# Patient Record
Sex: MALE | Race: WHITE | NOT HISPANIC OR LATINO | Employment: FULL TIME | URBAN - METROPOLITAN AREA
[De-identification: names, ages, dates, MRNs, and addresses within clinical notes are randomized per-mention and may not be internally consistent; named-entity substitution may affect disease eponyms.]

---

## 2022-01-03 ENCOUNTER — CLINICAL SUPPORT (OUTPATIENT)
Dept: FAMILY MEDICINE CLINIC | Facility: CLINIC | Age: 62
End: 2022-01-03

## 2022-01-03 DIAGNOSIS — Z11.59 ENCOUNTER FOR SCREENING FOR OTHER VIRAL DISEASES: Primary | ICD-10-CM

## 2022-01-03 LAB — SARS-COV-2 RNA RESP QL NAA+PROBE: POSITIVE

## 2022-01-03 PROCEDURE — U0003 INFECTIOUS AGENT DETECTION BY NUCLEIC ACID (DNA OR RNA); SEVERE ACUTE RESPIRATORY SYNDROME CORONAVIRUS 2 (SARS-COV-2) (CORONAVIRUS DISEASE [COVID-19]), AMPLIFIED PROBE TECHNIQUE, MAKING USE OF HIGH THROUGHPUT TECHNOLOGIES AS DESCRIBED BY CMS-2020-01-R: HCPCS | Performed by: FAMILY MEDICINE

## 2022-01-03 PROCEDURE — U0005 INFEC AGEN DETEC AMPLI PROBE: HCPCS | Performed by: FAMILY MEDICINE

## 2022-01-10 ENCOUNTER — TELEMEDICINE (OUTPATIENT)
Dept: FAMILY MEDICINE CLINIC | Facility: CLINIC | Age: 62
End: 2022-01-10
Payer: COMMERCIAL

## 2022-01-10 DIAGNOSIS — U07.1 COVID-19: Primary | ICD-10-CM

## 2022-01-10 PROCEDURE — 99442 PR PHYS/QHP TELEPHONE EVALUATION 11-20 MIN: CPT | Performed by: FAMILY MEDICINE

## 2022-01-10 NOTE — PROGRESS NOTES
COVID-19 Outpatient Progress Note    Assessment/Plan:    Problem List Items Addressed This Visit     None      Visit Diagnoses     COVID-19    -  Primary         Disposition:     Patient with significant improvement of his COVID-19 infection  At this time not demonstrating any shortness of breath or chest tightness  If anything changes to please contact our office  Patient with a long history of pneumothorax given recent accident  Advised him of the risk  He knows and he will closely monitor himself  I have spent 15 minutes directly with the patient  Encounter provider Vale Eastman MD    Provider located at [address] [place] [id number]    Recent Visits  No visits were found meeting these conditions  Showing recent visits within past 7 days and meeting all other requirements  Today's Visits  Date Type Provider Dept   01/10/22 Telemedicine Vale Eastman  [facility] today's visits and meeting all other requirements  Future Appointments  No visits were found meeting these conditions  Showing future appointments within next 150 days and meeting all other requirements       Patient agrees to participate in a virtual check in via telephone or video visit instead of presenting to the office to address urgent/immediate medical needs  Patient is aware this is a billable service  After connecting through [facility], the patient was identified by name and date of birth  Loretta Fairly was informed that this was a telemedicine visit and that the exam was being conducted confidentially over secure lines  My office door was closed  No one else was in the room  Loretta Fairly acknowledged consent and understanding of privacy and security of the telemedicine visit  I informed the patient that I have reviewed his record in Epic and presented the opportunity for him to ask any questions regarding the visit today   The patient agreed to participate  Verification of patient location:  Patient is located in the following state in which I hold an active license: [place]    Subjective:   Shannen Orantes is a 63 y o  male who has been screened for COVID-19  Symptom change since last report: resolving  Patient is currently asymptomatic  Patient denies fever, chills, fatigue, malaise, congestion, rhinorrhea, sore throat, anosmia, loss of taste, cough, shortness of breath, chest tightness, abdominal pain, nausea, vomiting, diarrhea, myalgias and headaches  - Date of symptom onset: 1/3/2022  - Date of positive COVID-19 PCR: 1/3/2022  Type of test: PCR    COVID-19 vaccination status: Fully vaccinated (primary series)    Maggie Anton has been staying home and has isolated themselves in his home  He is taking care to not share personal items and is cleaning all surfaces that are touched often, like counters, tabletops, and doorknobs using household cleaning sprays or wipes  He is wearing a mask when he leaves his room  Last Monday started with cough, and clearing it ok  Lab Results   Component Value Date    SARSCOV2 Positive (A) 01/03/2022     No past medical history on file  No past surgical history on file  No current outpatient medications on file  No current facility-administered medications for this visit  Not on File    Review of Systems   Constitutional: Negative for chills, fatigue and fever  HENT: Negative for congestion, rhinorrhea and sore throat  Respiratory: Negative for cough, chest tightness and shortness of breath  Gastrointestinal: Negative for abdominal pain, diarrhea, nausea and vomiting  Musculoskeletal: Negative for myalgias  Neurological: Negative for headaches  Objective: There were no vitals filed for this visit  VIRTUAL VISIT DISCLAIMER    Shannen Orantes verbally agrees to participate in [employer]   Pt is aware that Virtual Care Services could be limited without vital signs or the ability to perform a full hands-on physical Lina Mohamud understands he or the provider may request at any time to terminate the video visit and request the patient to seek care or treatment in person

## 2022-01-10 NOTE — LETTER
January 10, 2022     Patient: Chelle Galvez   YOB: 1960   Date of Visit: 1/10/2022       To Whom it May Concern:    Chelle Galvez is under my professional care  He was seen in my office on 1/10/2022  He may return to work on 1/14/22  If you have any questions or concerns, please don't hesitate to call           Sincerely,          Abena Lennon MD        CC: No Recipients

## 2023-02-10 ENCOUNTER — OFFICE VISIT (OUTPATIENT)
Dept: FAMILY MEDICINE CLINIC | Facility: CLINIC | Age: 63
End: 2023-02-10

## 2023-02-10 VITALS
DIASTOLIC BLOOD PRESSURE: 78 MMHG | TEMPERATURE: 97.2 F | HEART RATE: 78 BPM | WEIGHT: 253 LBS | HEIGHT: 72 IN | SYSTOLIC BLOOD PRESSURE: 110 MMHG | RESPIRATION RATE: 14 BRPM | BODY MASS INDEX: 34.27 KG/M2

## 2023-02-10 DIAGNOSIS — M25.461 EFFUSION OF RIGHT KNEE: Primary | ICD-10-CM

## 2023-02-10 RX ORDER — NAPROXEN 500 MG/1
500 TABLET ORAL 2 TIMES DAILY PRN
Qty: 30 TABLET | Refills: 0 | Status: SHIPPED | OUTPATIENT
Start: 2023-02-10

## 2023-02-10 RX ORDER — KETOROLAC TROMETHAMINE 30 MG/ML
30 INJECTION, SOLUTION INTRAMUSCULAR; INTRAVENOUS ONCE
Status: COMPLETED | OUTPATIENT
Start: 2023-02-10 | End: 2023-02-10

## 2023-02-10 RX ADMIN — KETOROLAC TROMETHAMINE 30 MG: 30 INJECTION, SOLUTION INTRAMUSCULAR; INTRAVENOUS at 10:00

## 2023-02-10 NOTE — PROGRESS NOTES
Nila Petty 1960 male MRN: [id number]    [address] [place] [address]      ASSESSMENT/PLAN  Nila Petty is a 63 y o  male presents to the office for    Diagnoses and all orders for this visit:    Effusion of right knee  -     naproxen (EC NAPROSYN) 500 MG EC tablet; Take 1 tablet (500 mg total) by mouth 2 (two) times a day as needed for mild pain  -     ketorolac (TORADOL) injection 30 mg     Toradol 30 mg given to the patient  Patient to avoid NSAID today  Naproxen as needed starting tomorrow  OK to use brace  Spoke with the patient previous orthopedic and got him an appointment for [phone] on Monday  No work till the 21st           No future appointments  SUBJECTIVE  CC: Knee Pain (Pt fell Wednesday on right knee/ pain and swelling )      HPI:  Nila Petty is a 63 y o  male who presents for an acute appointment  Was on the steps on Wednesday, felt a buckling sensation of his right knee maybe a littler higher which led him to fall to the floor and had direct impact to the right knee  Has had swelling right knee  Review of Systems   Constitutional: Negative for activity change, appetite change, chills, fatigue and fever  HENT: Negative for congestion  Respiratory: Negative for cough, chest tightness and shortness of breath  Cardiovascular: Negative for chest pain and leg swelling  Gastrointestinal: Negative for abdominal distention, abdominal pain, constipation, diarrhea, nausea and vomiting  Musculoskeletal: Positive for arthralgias  All other systems reviewed and are negative  Historical Information   The patient history was reviewed as follows:  History reviewed  No pertinent past medical history        Medications:     Current Outpatient Medications:   •  naproxen (EC NAPROSYN) 500 MG EC tablet, Take 1 tablet (500 mg total) by mouth 2 (two) times a day as needed for mild pain, Disp: 30 tablet, Rfl: 0  No current facility-administered medications for this visit  No Known Allergies    OBJECTIVE  Vitals:   Vitals:    02/10/23 0932   BP: 110/78   BP Location: Right arm   Patient Position: Sitting   Cuff Size: Large   Pulse: 78   Resp: 14   Temp: (!) 97 2 °F (36 2 °C)   TempSrc: Temporal   Weight: 115 kg (253 lb)   Height: 6' (1 829 m)         Physical Exam  Constitutional:       Appearance: Normal appearance  Pulmonary:      Effort: Pulmonary effort is normal    Musculoskeletal:         General: Swelling (Effusion present on the right knee) present  Normal range of motion  Neurological:      General: No focal deficit present  Mental Status: He is alert and oriented to person, place, and time  Psychiatric:         Mood and Affect: Mood normal          Behavior: Behavior normal          Thought Content:  Thought content normal          Judgment: Judgment normal                     Bob Fink MD,   [facility]  2/10/2023

## 2024-04-08 ENCOUNTER — OFFICE VISIT (OUTPATIENT)
Dept: FAMILY MEDICINE CLINIC | Facility: CLINIC | Age: 64
End: 2024-04-08
Payer: COMMERCIAL

## 2024-04-08 VITALS
BODY MASS INDEX: 33.86 KG/M2 | HEIGHT: 72 IN | OXYGEN SATURATION: 96 % | RESPIRATION RATE: 14 BRPM | WEIGHT: 250 LBS | TEMPERATURE: 97.6 F | HEART RATE: 80 BPM | SYSTOLIC BLOOD PRESSURE: 110 MMHG | DIASTOLIC BLOOD PRESSURE: 70 MMHG

## 2024-04-08 DIAGNOSIS — E87.8 ELECTROLYTE ABNORMALITY: ICD-10-CM

## 2024-04-08 DIAGNOSIS — Z13.1 DIABETES MELLITUS SCREENING: ICD-10-CM

## 2024-04-08 DIAGNOSIS — Z12.5 PROSTATE CANCER SCREENING: ICD-10-CM

## 2024-04-08 DIAGNOSIS — Z13.220 SCREENING CHOLESTEROL LEVEL: ICD-10-CM

## 2024-04-08 DIAGNOSIS — Z13.29 THYROID DISORDER SCREENING: ICD-10-CM

## 2024-04-08 DIAGNOSIS — Z00.00 ANNUAL PHYSICAL EXAM: Primary | ICD-10-CM

## 2024-04-08 DIAGNOSIS — H60.501 ACUTE OTITIS EXTERNA OF RIGHT EAR, UNSPECIFIED TYPE: ICD-10-CM

## 2024-04-08 DIAGNOSIS — Z13.0 SCREENING, IRON DEFICIENCY ANEMIA: ICD-10-CM

## 2024-04-08 PROCEDURE — 99213 OFFICE O/P EST LOW 20 MIN: CPT | Performed by: STUDENT IN AN ORGANIZED HEALTH CARE EDUCATION/TRAINING PROGRAM

## 2024-04-08 PROCEDURE — 99396 PREV VISIT EST AGE 40-64: CPT | Performed by: STUDENT IN AN ORGANIZED HEALTH CARE EDUCATION/TRAINING PROGRAM

## 2024-04-08 RX ORDER — CIPROFLOXACIN AND DEXAMETHASONE 3; 1 MG/ML; MG/ML
4 SUSPENSION/ DROPS AURICULAR (OTIC) 2 TIMES DAILY
Qty: 7.5 ML | Refills: 0 | Status: SHIPPED | OUTPATIENT
Start: 2024-04-08

## 2024-04-08 NOTE — PROGRESS NOTES
ADULT ANNUAL PHYSICAL  [facility]    NAME: Chucho Montenegro  AGE: 63 y.o. SEX: male  : 1960     DATE: 2024     Assessment and Plan:     Problem List Items Addressed This Visit    None  Visit Diagnoses     Annual physical exam    -  Primary    Acute otitis externa of right ear, unspecified type        Relevant Medications    ciprofloxacin-dexamethasone (CIPRODEX) otic suspension    Thyroid disorder screening        Relevant Orders    TSH, 3rd generation with Free T4 reflex    Screening cholesterol level        Relevant Orders    Lipid panel    Diabetes mellitus screening        Relevant Orders    Hemoglobin A1C    Electrolyte abnormality        Relevant Orders    Comprehensive metabolic panel    Screening, iron deficiency anemia        Relevant Orders    CBC and differential    Prostate cancer screening        Relevant Orders    PSA, Total Screen        Patient is a pleasant 63-year-old male coming in for annual wellness/acute care visit secondary to right ear acute otitis externa, Ciprodex sent to pharmacy, symptoms worsen or not improving reevaluation encouraged.    Patient is due for blood work, ordered, pending.    Ongoing discussion on colorectal cancer screening.    Follow-up after blood work recommended.    Immunizations and preventive care screenings were discussed with patient today. Appropriate education was printed on patient's after visit summary.    Discussed risks and benefits of prostate cancer screening. We discussed the controversial history of PSA screening for prostate cancer in the [place] as well as the risk of over detection and over treatment of prostate cancer by way of PSA screening.  The patient understands that PSA blood testing is an imperfect way to screen for prostate cancer and that elevated PSA levels in the blood may also be caused by infection, inflammation, prostatic trauma or manipulation, urological  procedures, or by benign prostatic enlargement.    The role of the digital rectal examination in prostate cancer screening was also discussed and I discussed with him that there is large interobserver variability in the findings of digital rectal examination.    Counseling:  Alcohol/drug use: discussed moderation in alcohol intake, the recommendations for healthy alcohol use, and avoidance of illicit drug use.  Dental Health: discussed importance of regular tooth brushing, flossing, and dental visits.  Injury prevention: discussed safety/seat belts, safety helmets, smoke detectors, carbon dioxide detectors, and smoking near bedding or upholstery.  Sexual health: discussed sexually transmitted diseases, partner selection, use of condoms, avoidance of unintended pregnancy, and contraceptive alternatives.  Exercise: the importance of regular exercise/physical activity was discussed. Recommend exercise 3-5 times per week for at least 30 minutes.       Depression Screening and Follow-up Plan: Patient was screened for depression during today's encounter. They screened negative with a PHQ-2 score of 0.        Return if symptoms worsen or fail to improve.     Chief Complaint:     Chief Complaint   Patient presents with   • Earache     Rt ear pain      History of Present Illness:     Adult Annual Physical   Patient here for a comprehensive physical exam.    Diet and Physical Activity  Diet/Nutrition: well balanced diet.   Exercise: 3-4 times a week on average.      Depression Screening  PHQ-2/9 Depression Screening    Little interest or pleasure in doing things: 0 - not at all  Feeling down, depressed, or hopeless: 0 - not at all  PHQ-2 Score: 0  PHQ-2 Interpretation: Negative depression screen       General Health  Sleep: sleeps well.   Hearing: normal - bilateral.  Vision: wears glasses.   Dental: regular dental visits.        Health  Symptoms include: none     Review of Systems:     Review of Systems   Constitutional:   Negative for chills, fatigue and fever.   HENT:  Positive for ear discharge and ear pain. Negative for congestion and sore throat.    Eyes:  Negative for pain and visual disturbance.   Respiratory:  Negative for shortness of breath and wheezing.    Cardiovascular:  Negative for chest pain and palpitations.   Gastrointestinal:  Negative for abdominal pain, constipation, diarrhea, nausea and vomiting.   Genitourinary:  Negative for dysuria and frequency.   Musculoskeletal:  Negative for back pain and neck pain.   Skin:  Negative for color change and rash.   Neurological:  Negative for dizziness and headaches.   Psychiatric/Behavioral:  Negative for agitation and confusion.    All other systems reviewed and are negative.     Past Medical History:     History reviewed. No pertinent past medical history.   Past Surgical History:     Past Surgical History:   Procedure Laterality Date   • HIP SURGERY Right       Family History:     History reviewed. No pertinent family history.   Social History:     Social History     Socioeconomic History   • Marital status: Single     Spouse name: None   • Number of children: None   • Years of education: None   • Highest education level: None   Occupational History   • None   Tobacco Use   • Smoking status: Never   • Smokeless tobacco: Never   Vaping Use   • Vaping status: Never Used   Substance and Sexual Activity   • Alcohol use: Never   • Drug use: None   • Sexual activity: None   Other Topics Concern   • None   Social History Narrative   • None     Social Determinants of Health     Financial Resource Strain: Not on file   Food Insecurity: Not on file   Transportation Needs: Not on file   Physical Activity: Not on file   Stress: Not on file   Social Connections: Not on file   Intimate Partner Violence: Not on file   Housing Stability: Not on file      Current Medications:     Current Outpatient Medications   Medication Sig Dispense Refill   • ciprofloxacin-dexamethasone (CIPRODEX)  otic suspension Administer 4 drops to the right ear 2 (two) times a day 7.5 mL 0     No current facility-administered medications for this visit.      Allergies:     No Known Allergies   Physical Exam:     /70 (BP Location: Left arm, Patient Position: Sitting, Cuff Size: Adult)   Pulse 80   Temp 97.6 °F (36.4 °C) (Temporal)   Resp 14   Ht 6' (1.829 m)   Wt 113 kg (250 lb)   SpO2 96%   BMI 33.91 kg/m²     Physical Exam  Vitals and nursing note reviewed.   Constitutional:       General: He is not in acute distress.     Appearance: He is well-developed.   HENT:      Head: Normocephalic and atraumatic.      Ears:      Comments: Otitis externa right side  Eyes:      General: No scleral icterus.     Conjunctiva/sclera: Conjunctivae normal.      Pupils: Pupils are equal, round, and reactive to light.   Cardiovascular:      Rate and Rhythm: Normal rate and regular rhythm.      Heart sounds: No murmur heard.  Pulmonary:      Effort: Pulmonary effort is normal. No respiratory distress.      Breath sounds: Normal breath sounds.   Abdominal:      General: Bowel sounds are normal. There is no distension.      Palpations: Abdomen is soft.      Tenderness: There is no abdominal tenderness.   Musculoskeletal:         General: No swelling.      Cervical back: Neck supple.   Skin:     General: Skin is warm and dry.      Capillary Refill: Capillary refill takes less than 2 seconds.   Neurological:      General: No focal deficit present.      Mental Status: He is alert and oriented to person, place, and time. Mental status is at baseline.   Psychiatric:         Mood and Affect: Mood normal.         Behavior: Behavior normal.          Shaquille Calles DO  [facility]

## 2024-05-10 ENCOUNTER — APPOINTMENT (OUTPATIENT)
Dept: LAB | Facility: CLINIC | Age: 64
End: 2024-05-10
Payer: COMMERCIAL

## 2024-05-10 DIAGNOSIS — Z13.220 SCREENING CHOLESTEROL LEVEL: ICD-10-CM

## 2024-05-10 DIAGNOSIS — Z13.1 DIABETES MELLITUS SCREENING: ICD-10-CM

## 2024-05-10 DIAGNOSIS — Z12.5 PROSTATE CANCER SCREENING: ICD-10-CM

## 2024-05-10 DIAGNOSIS — Z13.0 SCREENING, IRON DEFICIENCY ANEMIA: ICD-10-CM

## 2024-05-10 DIAGNOSIS — E87.8 ELECTROLYTE ABNORMALITY: ICD-10-CM

## 2024-05-10 DIAGNOSIS — Z13.29 THYROID DISORDER SCREENING: ICD-10-CM

## 2024-05-10 LAB
ALBUMIN SERPL BCP-MCNC: 4.3 G/DL (ref 3.5–5)
ALP SERPL-CCNC: 44 U/L (ref 34–104)
ALT SERPL W P-5'-P-CCNC: 29 U/L (ref 7–52)
ANION GAP SERPL CALCULATED.3IONS-SCNC: 6 MMOL/L (ref 4–13)
AST SERPL W P-5'-P-CCNC: 19 U/L (ref 13–39)
BASOPHILS # BLD AUTO: 0.06 THOUSANDS/ÂΜL (ref 0–0.1)
BASOPHILS NFR BLD AUTO: 1 % (ref 0–1)
BILIRUB SERPL-MCNC: 0.49 MG/DL (ref 0.2–1)
BUN SERPL-MCNC: 17 MG/DL (ref 5–25)
CALCIUM SERPL-MCNC: 9.5 MG/DL (ref 8.4–10.2)
CHLORIDE SERPL-SCNC: 103 MMOL/L (ref 96–108)
CHOLEST SERPL-MCNC: 132 MG/DL
CO2 SERPL-SCNC: 28 MMOL/L (ref 21–32)
CREAT SERPL-MCNC: 0.87 MG/DL (ref 0.6–1.3)
EOSINOPHIL # BLD AUTO: 0.18 THOUSAND/ÂΜL (ref 0–0.61)
EOSINOPHIL NFR BLD AUTO: 3 % (ref 0–6)
ERYTHROCYTE [DISTWIDTH] IN BLOOD BY AUTOMATED COUNT: 13.8 % (ref 11.6–15.1)
GFR SERPL CREATININE-BSD FRML MDRD: 93 ML/MIN/1.73SQ M
GLUCOSE P FAST SERPL-MCNC: 97 MG/DL (ref 65–99)
HCT VFR BLD AUTO: 51.3 % (ref 36.5–49.3)
HDLC SERPL-MCNC: 45 MG/DL
HGB BLD-MCNC: 16.5 G/DL (ref 12–17)
IMM GRANULOCYTES # BLD AUTO: 0.05 THOUSAND/UL (ref 0–0.2)
IMM GRANULOCYTES NFR BLD AUTO: 1 % (ref 0–2)
LDLC SERPL CALC-MCNC: 69 MG/DL (ref 0–100)
LYMPHOCYTES # BLD AUTO: 0.99 THOUSANDS/ÂΜL (ref 0.6–4.47)
LYMPHOCYTES NFR BLD AUTO: 16 % (ref 14–44)
MCH RBC QN AUTO: 30.1 PG (ref 26.8–34.3)
MCHC RBC AUTO-ENTMCNC: 32.2 G/DL (ref 31.4–37.4)
MCV RBC AUTO: 94 FL (ref 82–98)
MONOCYTES # BLD AUTO: 0.69 THOUSAND/ÂΜL (ref 0.17–1.22)
MONOCYTES NFR BLD AUTO: 11 % (ref 4–12)
NEUTROPHILS # BLD AUTO: 4.27 THOUSANDS/ÂΜL (ref 1.85–7.62)
NEUTS SEG NFR BLD AUTO: 68 % (ref 43–75)
NONHDLC SERPL-MCNC: 87 MG/DL
NRBC BLD AUTO-RTO: 0 /100 WBCS
PLATELET # BLD AUTO: 318 THOUSANDS/UL (ref 149–390)
PMV BLD AUTO: 10.3 FL (ref 8.9–12.7)
POTASSIUM SERPL-SCNC: 4.5 MMOL/L (ref 3.5–5.3)
PROT SERPL-MCNC: 7.5 G/DL (ref 6.4–8.4)
PSA SERPL-MCNC: 2.55 NG/ML (ref 0–4)
RBC # BLD AUTO: 5.48 MILLION/UL (ref 3.88–5.62)
SODIUM SERPL-SCNC: 137 MMOL/L (ref 135–147)
TRIGL SERPL-MCNC: 89 MG/DL
TSH SERPL DL<=0.05 MIU/L-ACNC: 0.8 UIU/ML (ref 0.45–4.5)
WBC # BLD AUTO: 6.24 THOUSAND/UL (ref 4.31–10.16)

## 2024-05-10 PROCEDURE — 85025 COMPLETE CBC W/AUTO DIFF WBC: CPT

## 2024-05-10 PROCEDURE — 84443 ASSAY THYROID STIM HORMONE: CPT

## 2024-05-10 PROCEDURE — 80053 COMPREHEN METABOLIC PANEL: CPT

## 2024-05-10 PROCEDURE — 80061 LIPID PANEL: CPT

## 2024-05-10 PROCEDURE — G0103 PSA SCREENING: HCPCS

## 2024-05-10 PROCEDURE — 83036 HEMOGLOBIN GLYCOSYLATED A1C: CPT

## 2024-05-10 PROCEDURE — 36415 COLL VENOUS BLD VENIPUNCTURE: CPT

## 2024-05-11 LAB
EST. AVERAGE GLUCOSE BLD GHB EST-MCNC: 131 MG/DL
HBA1C MFR BLD: 6.2 %

## 2024-07-31 ENCOUNTER — APPOINTMENT (OUTPATIENT)
Dept: LAB | Facility: CLINIC | Age: 64
End: 2024-07-31
Payer: COMMERCIAL

## 2024-08-08 ENCOUNTER — OFFICE VISIT (OUTPATIENT)
Dept: FAMILY MEDICINE CLINIC | Facility: CLINIC | Age: 64
End: 2024-08-08
Payer: COMMERCIAL

## 2024-08-08 VITALS
WEIGHT: 245 LBS | RESPIRATION RATE: 14 BRPM | HEART RATE: 78 BPM | OXYGEN SATURATION: 98 % | SYSTOLIC BLOOD PRESSURE: 110 MMHG | HEIGHT: 72 IN | TEMPERATURE: 98 F | DIASTOLIC BLOOD PRESSURE: 72 MMHG | BODY MASS INDEX: 33.18 KG/M2

## 2024-08-08 DIAGNOSIS — I45.4 NONSPECIFIC INTRAVENTRICULAR BLOCK: ICD-10-CM

## 2024-08-08 DIAGNOSIS — Z01.818 PRE-OP EXAMINATION: Primary | ICD-10-CM

## 2024-08-08 DIAGNOSIS — S76.111D QUADRICEPS MUSCLE RUPTURE, RIGHT, SUBSEQUENT ENCOUNTER: ICD-10-CM

## 2024-08-08 PROCEDURE — 99215 OFFICE O/P EST HI 40 MIN: CPT | Performed by: STUDENT IN AN ORGANIZED HEALTH CARE EDUCATION/TRAINING PROGRAM

## 2024-08-08 PROCEDURE — 93000 ELECTROCARDIOGRAM COMPLETE: CPT | Performed by: STUDENT IN AN ORGANIZED HEALTH CARE EDUCATION/TRAINING PROGRAM

## 2024-08-08 NOTE — PROGRESS NOTES
Presurgical Evaluation    Subjective:      Patient ID: Chucho Montenegro is a 63 y.o. male.    Chief Complaint   Patient presents with    Pre-op Exam     Per Dr Adriana Lundy       Prior anesthesia: Yes   General; Complications:  None / Tolerated well    CAD History: None    Pulmonary History: None    Renal history: None    Diabetes History:  None     Neurological History: None     On Immunosuppressant meds/biologics: No      Review of Systems   Constitutional:  Negative for chills, fatigue and fever.   HENT:  Negative for congestion and sore throat.    Eyes:  Negative for pain and visual disturbance.   Respiratory:  Negative for shortness of breath and wheezing.    Cardiovascular:  Negative for chest pain and palpitations.   Gastrointestinal:  Negative for abdominal pain, constipation, diarrhea, nausea and vomiting.   Genitourinary:  Negative for dysuria and frequency.   Musculoskeletal:  Negative for back pain and neck pain.   Skin:  Negative for color change and rash.   Neurological:  Negative for dizziness and headaches.   Psychiatric/Behavioral:  Negative for agitation and confusion.    All other systems reviewed and are negative.        No current outpatient medications on file.     No current facility-administered medications for this visit.       Allergies on file:   Patient has no known allergies.    There is no problem list on file for this patient.       History reviewed. No pertinent past medical history.    Past Surgical History:   Procedure Laterality Date    HIP SURGERY Right        History reviewed. No pertinent family history.    Social History     Tobacco Use    Smoking status: Never    Smokeless tobacco: Never   Vaping Use    Vaping status: Never Used   Substance Use Topics    Alcohol use: Never       Objective:    Vitals:    08/08/24 1053   BP: 110/72   BP Location: Left arm   Patient Position: Sitting   Cuff Size: Adult   Pulse: 78   Resp: 14   Temp: 98 °F (36.7 °C)   TempSrc: Temporal    SpO2: 98%   Weight: 111 kg (245 lb)   Height: 6' (1.829 m)        Physical Exam  Constitutional:       General: He is not in acute distress.  HENT:      Head: Normocephalic and atraumatic.   Eyes:      General: No scleral icterus.     Conjunctiva/sclera: Conjunctivae normal.   Cardiovascular:      Rate and Rhythm: Normal rate and regular rhythm.      Pulses: Normal pulses.      Heart sounds: No murmur heard.  Pulmonary:      Effort: Pulmonary effort is normal. No respiratory distress.      Breath sounds: Normal breath sounds. No wheezing or rales.   Abdominal:      General: Bowel sounds are normal. There is no distension.      Palpations: Abdomen is soft.      Tenderness: There is no abdominal tenderness. There is no guarding.   Musculoskeletal:         General: No swelling. Normal range of motion.   Skin:     General: Skin is warm and dry.      Capillary Refill: Capillary refill takes less than 2 seconds.      Coloration: Skin is not jaundiced.   Neurological:      General: No focal deficit present.      Mental Status: He is alert and oriented to person, place, and time. Mental status is at baseline.   Psychiatric:         Mood and Affect: Mood normal.         Behavior: Behavior normal.           Preop labs/testing available and reviewed: yes    eGFR   Date Value Ref Range Status   07/31/2024 94 ml/min/1.73sq m Final     WBC   Date Value Ref Range Status   07/31/2024 5.88 4.31 - 10.16 Thousand/uL Final     Hemoglobin   Date Value Ref Range Status   07/31/2024 15.2 12.0 - 17.0 g/dL Final     Hematocrit   Date Value Ref Range Status   07/31/2024 47.7 36.5 - 49.3 % Final     Platelets   Date Value Ref Range Status   07/31/2024 276 149 - 390 Thousands/uL Final          EKG yes    Echo yes    Stress test/cath no    PFT/Cas no    Functional capacity: Walking , 4-5 MPH               4 Mets   Pick the highest level patient can comfortably perform   4 mets or greater for surgery    RCRI  High Risk surgery?         1  Point  CAD History:         1 Point   MI; Positive Stress Test; CP due to Mi;  Nitrate Usage to control Angina; Pathologic Q wave on EKG  CHF Active:         1 Point   Pulm Edema; Paroxysmal Nocturnal Dyspnea;  Bibasilar Rales (crackles);S3; CHF on CXR  Cerebrovascular Disease (TIA or CVA):     1 Point  DM on Insulin:        1 Point  Serum Creat >2.0 mg/dl:       1 Point          Total Points: 0     Scorin: Class I, Very Low Risk (0.4%)     1: Class II, Low risk (0.9%)     2: Class III Moderate (6.6%)     3: Class IV High (>11%)      AMBER Risk:  GFR:   eGFR   Date Value Ref Range Status   2024 94 ml/min/1.73sq m Final         For PCP:  If GFR>60, Hold ACE/ARB/Diuretic on the day of surgery, and NSAIDS 10 days before.    If GFR<45, Consider PRE and POST op Nephrology Consult.    If 46 <GFR> 59 : Has Patient had AMBER in last 6 Months? no   If YES: Preop Nephrology consult   If No:  Post Op Nephrology consult.           Assessment/Plan:    Patient is medically optimized for the planned procedure.    Further testing/evaluation is not required.    Postop concerns: no    Problem List Items Addressed This Visit    None  Visit Diagnoses       Pre-op examination    -  Primary    Relevant Orders    POCT ECG (Completed)    Quadriceps muscle rupture, right, subsequent encounter        Nonspecific intraventricular block        Relevant Orders    Echo complete w/ contrast if indicated          Patient is a pleasant 63-year-old male coming in for preoperative examination, patient is medically optimized for planned procedure.  EKG NSR, no signs of ischemia, intervals appropriate, nonspecific intraventricular block, asymptomatic, will follow-up routine management echocardiogram, looking at previous EKGs, likely right bundle branch block.  Patient has recently been under anesthesia for carpal tunnel surgery without concerns.  Follow-up after medical procedure.    NOTE: Please use the above to review important meds for your  "specialty, the remainder \"per anesthesia Guidelines.\"    NOTE: Please place an Inbasket message for \"SOC\" pool for complicated patients.     "

## 2025-02-13 ENCOUNTER — OFFICE VISIT (OUTPATIENT)
Dept: FAMILY MEDICINE CLINIC | Facility: CLINIC | Age: 65
End: 2025-02-13
Payer: COMMERCIAL

## 2025-02-13 VITALS
OXYGEN SATURATION: 97 % | TEMPERATURE: 99.2 F | BODY MASS INDEX: 33.72 KG/M2 | HEIGHT: 72 IN | RESPIRATION RATE: 14 BRPM | WEIGHT: 249 LBS | HEART RATE: 80 BPM | SYSTOLIC BLOOD PRESSURE: 110 MMHG | DIASTOLIC BLOOD PRESSURE: 72 MMHG

## 2025-02-13 DIAGNOSIS — D17.0 LIPOMA OF NECK: Primary | ICD-10-CM

## 2025-02-13 PROCEDURE — 99214 OFFICE O/P EST MOD 30 MIN: CPT | Performed by: FAMILY MEDICINE

## 2025-02-13 NOTE — PROGRESS NOTES
Patient with lipoma of the Chucho Montenegro 1960 male MRN: [id number]    [facility] PRACT[facility] OFFICE VISIT  [facility]      ASSESSMENT/PLAN  Chucho Montenegro is a 64 y.o. male presents to the office for    Diagnoses and all orders for this visit:    Lipoma of neck  -     MRI soft tissue neck wo contrast; Future  -     Ambulatory Referral to General Surgery; Future      Patient with a hard mass over the right neck likely a lipoma.  Given that it is increasing in size and invading in the spine I recommend an MRI prior to surgical consult.  Patient aware to get the MRI performed if possible           No future appointments.       SUBJECTIVE  CC: Mass (Pt c/o lump on neck)      HPI:  Chucho Montenegro is a 64 y.o. male who presents for an acute appointment.Worsening pain in the neck ( sensation is off) Shoulder has been going numb more then normal. Range of motion brings back sensation.  States that he feels like it has been there for a long time but has definitely been increasing in size  Review of Systems   Constitutional:  Negative for activity change, appetite change, chills, fatigue and fever.   HENT:  Negative for congestion.    Respiratory:  Negative for cough, chest tightness and shortness of breath.    Cardiovascular:  Negative for chest pain and leg swelling.   Gastrointestinal:  Negative for abdominal distention, abdominal pain, constipation, diarrhea, nausea and vomiting.   Musculoskeletal:  Positive for neck pain.   All other systems reviewed and are negative.      Historical Information   The patient history was reviewed as follows:  History reviewed. No pertinent past medical history.      Medications:   No current outpatient medications on file.    No Known Allergies    OBJECTIVE  Vitals:   Vitals:    02/13/25 1506   BP: 110/72   BP Location: Left arm   Patient Position: Sitting   Cuff Size: Adult   Pulse: 80   Resp: 14   Temp: 99.2 °F (37.3 °C)   TempSrc: Temporal    SpO2: 97%   Weight: 113 kg (249 lb)   Height: 6' (1.829 m)         Physical Exam  Constitutional:       Appearance: Normal appearance.   Neck:      Comments: Hard nontender mass of the right cervical spine 4 x 4 centimeters  Pulmonary:      Effort: Pulmonary effort is normal.   Musculoskeletal:         General: Normal range of motion.   Neurological:      General: No focal deficit present.      Mental Status: He is alert and oriented to person, place, and time.   Psychiatric:         Mood and Affect: Mood normal.         Behavior: Behavior normal.         Thought Content: Thought content normal.         Judgment: Judgment normal.                    Danielle Zambrano MD,   [facility]  2/18/2025

## 2025-02-14 ENCOUNTER — TELEPHONE (OUTPATIENT)
Age: 65
End: 2025-02-14

## 2025-02-14 NOTE — TELEPHONE ENCOUNTER
[facility] called asking for the pt last OV notes; as it is needed for the MRI the pt will be having. Please fax to [phone] ATTN: Pre cert prior auth.

## 2025-02-24 ENCOUNTER — RESULTS FOLLOW-UP (OUTPATIENT)
Dept: FAMILY MEDICINE CLINIC | Facility: CLINIC | Age: 65
End: 2025-02-24

## 2025-02-25 ENCOUNTER — TELEPHONE (OUTPATIENT)
Age: 65
End: 2025-02-25

## 2025-02-25 NOTE — TELEPHONE ENCOUNTER
"PT called in to schedule new patient appointment with Dr. Martinez to evaluate lipoma on back in between shoulder blades. Reports that he has had lipoma \"for years\" but only recently started bothering him; endorses that lipoma presses on nerve and causes intermittent LUE numbness.     PT had MRI completed (noted active order in chart), PT reports that he has images & report and will bring to appointment.     Offered next available appointment with Dr. Martinez. PT will call back to schedule. No further action needed at this time.   "

## 2025-03-13 ENCOUNTER — OFFICE VISIT (OUTPATIENT)
Dept: FAMILY MEDICINE CLINIC | Facility: CLINIC | Age: 65
End: 2025-03-13
Payer: COMMERCIAL

## 2025-03-13 ENCOUNTER — APPOINTMENT (EMERGENCY)
Dept: RADIOLOGY | Facility: HOSPITAL | Age: 65
End: 2025-03-13
Payer: COMMERCIAL

## 2025-03-13 ENCOUNTER — HOSPITAL ENCOUNTER (EMERGENCY)
Facility: HOSPITAL | Age: 65
Discharge: HOME | End: 2025-03-13
Attending: STUDENT IN AN ORGANIZED HEALTH CARE EDUCATION/TRAINING PROGRAM
Payer: COMMERCIAL

## 2025-03-13 VITALS
RESPIRATION RATE: 14 BRPM | SYSTOLIC BLOOD PRESSURE: 110 MMHG | HEIGHT: 72 IN | OXYGEN SATURATION: 98 % | BODY MASS INDEX: 33.86 KG/M2 | DIASTOLIC BLOOD PRESSURE: 78 MMHG | TEMPERATURE: 100.2 F | HEART RATE: 106 BPM | WEIGHT: 250 LBS

## 2025-03-13 VITALS
OXYGEN SATURATION: 95 % | WEIGHT: 250 LBS | RESPIRATION RATE: 18 BRPM | DIASTOLIC BLOOD PRESSURE: 65 MMHG | TEMPERATURE: 98.3 F | SYSTOLIC BLOOD PRESSURE: 107 MMHG | BODY MASS INDEX: 33.86 KG/M2 | HEIGHT: 72 IN | HEART RATE: 85 BPM

## 2025-03-13 DIAGNOSIS — R10.32 LLQ PAIN: Primary | ICD-10-CM

## 2025-03-13 DIAGNOSIS — R50.81 FEVER IN OTHER DISEASES: ICD-10-CM

## 2025-03-13 DIAGNOSIS — K57.92 DIVERTICULITIS: Primary | ICD-10-CM

## 2025-03-13 LAB
ALBUMIN SERPL BCG-MCNC: 4.3 G/DL (ref 3.5–5)
ALP SERPL-CCNC: 41 U/L (ref 34–104)
ALT SERPL W P-5'-P-CCNC: 27 U/L (ref 7–52)
ANION GAP SERPL CALCULATED.3IONS-SCNC: 11 MMOL/L (ref 4–13)
AST SERPL W P-5'-P-CCNC: 19 U/L (ref 13–39)
BACTERIA UR QL AUTO: ABNORMAL /HPF
BASOPHILS # BLD AUTO: 0.05 THOUSANDS/ÂΜL (ref 0–0.1)
BASOPHILS NFR BLD AUTO: 0 % (ref 0–1)
BILIRUB SERPL-MCNC: 0.86 MG/DL (ref 0.2–1)
BILIRUB UR QL STRIP: NEGATIVE
BUN SERPL-MCNC: 14 MG/DL (ref 5–25)
CALCIUM SERPL-MCNC: 9.4 MG/DL (ref 8.4–10.2)
CHLORIDE SERPL-SCNC: 102 MMOL/L (ref 96–108)
CLARITY UR: CLEAR
CO2 SERPL-SCNC: 23 MMOL/L (ref 21–32)
COLOR UR: YELLOW
CREAT SERPL-MCNC: 0.93 MG/DL (ref 0.6–1.3)
EOSINOPHIL # BLD AUTO: 0.11 THOUSAND/ÂΜL (ref 0–0.61)
EOSINOPHIL NFR BLD AUTO: 1 % (ref 0–6)
ERYTHROCYTE [DISTWIDTH] IN BLOOD BY AUTOMATED COUNT: 14.2 % (ref 11.6–15.1)
GFR SERPL CREATININE-BSD FRML MDRD: 87 ML/MIN/1.73SQ M
GLUCOSE SERPL-MCNC: 97 MG/DL (ref 65–140)
GLUCOSE UR STRIP-MCNC: NEGATIVE MG/DL
HCT VFR BLD AUTO: 48.1 % (ref 36.5–49.3)
HGB BLD-MCNC: 16 G/DL (ref 12–17)
HGB UR QL STRIP.AUTO: ABNORMAL
IMM GRANULOCYTES # BLD AUTO: 0.08 THOUSAND/UL (ref 0–0.2)
IMM GRANULOCYTES NFR BLD AUTO: 1 % (ref 0–2)
KETONES UR STRIP-MCNC: NEGATIVE MG/DL
LEUKOCYTE ESTERASE UR QL STRIP: NEGATIVE
LIPASE SERPL-CCNC: 23 U/L (ref 11–82)
LYMPHOCYTES # BLD AUTO: 1.15 THOUSANDS/ÂΜL (ref 0.6–4.47)
LYMPHOCYTES NFR BLD AUTO: 8 % (ref 14–44)
MCH RBC QN AUTO: 30.3 PG (ref 26.8–34.3)
MCHC RBC AUTO-ENTMCNC: 33.3 G/DL (ref 31.4–37.4)
MCV RBC AUTO: 91 FL (ref 82–98)
MONOCYTES # BLD AUTO: 1.57 THOUSAND/ÂΜL (ref 0.17–1.22)
MONOCYTES NFR BLD AUTO: 11 % (ref 4–12)
NEUTROPHILS # BLD AUTO: 11.37 THOUSANDS/ÂΜL (ref 1.85–7.62)
NEUTS SEG NFR BLD AUTO: 79 % (ref 43–75)
NITRITE UR QL STRIP: NEGATIVE
NON-SQ EPI CELLS URNS QL MICRO: ABNORMAL /HPF
NRBC BLD AUTO-RTO: 0 /100 WBCS
PH UR STRIP.AUTO: 6 [PH]
PLATELET # BLD AUTO: 263 THOUSANDS/UL (ref 149–390)
PMV BLD AUTO: 9.3 FL (ref 8.9–12.7)
POTASSIUM SERPL-SCNC: 4.3 MMOL/L (ref 3.5–5.3)
PROT SERPL-MCNC: 7.6 G/DL (ref 6.4–8.4)
PROT UR STRIP-MCNC: ABNORMAL MG/DL
RBC # BLD AUTO: 5.28 MILLION/UL (ref 3.88–5.62)
RBC #/AREA URNS AUTO: ABNORMAL /HPF
SODIUM SERPL-SCNC: 136 MMOL/L (ref 135–147)
SP GR UR STRIP.AUTO: <1.005 (ref 1–1.03)
UROBILINOGEN UR STRIP-ACNC: <2 MG/DL
WBC # BLD AUTO: 14.33 THOUSAND/UL (ref 4.31–10.16)
WBC #/AREA URNS AUTO: ABNORMAL /HPF

## 2025-03-13 PROCEDURE — 81001 URINALYSIS AUTO W/SCOPE: CPT | Performed by: STUDENT IN AN ORGANIZED HEALTH CARE EDUCATION/TRAINING PROGRAM

## 2025-03-13 PROCEDURE — 36415 COLL VENOUS BLD VENIPUNCTURE: CPT | Performed by: STUDENT IN AN ORGANIZED HEALTH CARE EDUCATION/TRAINING PROGRAM

## 2025-03-13 PROCEDURE — 96374 THER/PROPH/DIAG INJ IV PUSH: CPT

## 2025-03-13 PROCEDURE — 99284 EMERGENCY DEPT VISIT MOD MDM: CPT

## 2025-03-13 PROCEDURE — 80053 COMPREHEN METABOLIC PANEL: CPT | Performed by: STUDENT IN AN ORGANIZED HEALTH CARE EDUCATION/TRAINING PROGRAM

## 2025-03-13 PROCEDURE — 74177 CT ABD & PELVIS W/CONTRAST: CPT

## 2025-03-13 PROCEDURE — 99214 OFFICE O/P EST MOD 30 MIN: CPT | Performed by: FAMILY MEDICINE

## 2025-03-13 PROCEDURE — 96361 HYDRATE IV INFUSION ADD-ON: CPT

## 2025-03-13 PROCEDURE — 85025 COMPLETE CBC W/AUTO DIFF WBC: CPT | Performed by: STUDENT IN AN ORGANIZED HEALTH CARE EDUCATION/TRAINING PROGRAM

## 2025-03-13 PROCEDURE — 99285 EMERGENCY DEPT VISIT HI MDM: CPT | Performed by: STUDENT IN AN ORGANIZED HEALTH CARE EDUCATION/TRAINING PROGRAM

## 2025-03-13 PROCEDURE — 83690 ASSAY OF LIPASE: CPT | Performed by: STUDENT IN AN ORGANIZED HEALTH CARE EDUCATION/TRAINING PROGRAM

## 2025-03-13 RX ORDER — ACETAMINOPHEN 10 MG/ML
1000 INJECTION, SOLUTION INTRAVENOUS ONCE
Status: COMPLETED | OUTPATIENT
Start: 2025-03-13 | End: 2025-03-13

## 2025-03-13 RX ADMIN — ACETAMINOPHEN 1000 MG: 10 INJECTION INTRAVENOUS at 18:11

## 2025-03-13 RX ADMIN — AMOXICILLIN AND CLAVULANATE POTASSIUM 1 TABLET: 875; 125 TABLET, FILM COATED ORAL at 20:21

## 2025-03-13 RX ADMIN — SODIUM CHLORIDE 1000 ML: 0.9 INJECTION, SOLUTION INTRAVENOUS at 18:09

## 2025-03-13 RX ADMIN — IOHEXOL 100 ML: 350 INJECTION, SOLUTION INTRAVENOUS at 18:47

## 2025-03-14 NOTE — DISCHARGE INSTRUCTIONS
You were seen in the department for abdominal pain.  You were found to have diverticulitis.  Please take the antibiotics as prescribed.  Return the emergency room for any worsening pain, fevers, chills, trouble eating or drinking, or for any other new or concerning symptoms.

## 2025-03-14 NOTE — ED PROVIDER NOTES
Time reflects when diagnosis was documented in both MDM as applicable and the Disposition within this note       Time User Action Codes Description Comment    3/13/2025  8:59 PM Oziel Schulte Add [K57.92] Diverticulitis           ED Disposition       ED Disposition   Discharge    Condition   Stable    Date/Time   Thu Mar 13, 2025  8:15 PM    Comment   Chucho Montenegro discharge to home/self care.                   Assessment & Plan       Medical Decision Making  Patient is a 64 y.o. male who presents to the ED for left lower quadrant abdominal pain.  Patient is nontoxic, well-appearing.     Differential includes but is not limited to: Diverticulitis, colitis.  Presentation not consistent with UTI (no urinary symptoms).    Plan: Labs, CT scan, reassess                   Amount and/or Complexity of Data Reviewed  Labs: ordered.  Radiology: ordered. Decision-making details documented in ED Course.    Risk  Prescription drug management.        ED Course as of 03/13/25 2149   Thu Mar 13, 2025   2011 CT abdomen pelvis w contrast  Acute diverticulitis in the proximal sigmoid colon with no perforation or abscess.     Diffuse bladder wall thickening. Cystitis in the differential..     2104 Patient reevaluated.  Resting comfortably.  Discussed results and findings.  Will discharge.  Discussed antibiotics.  Strict return precautions discussed.  Patient verbalized understanding and agreed to plan of care.       Medications   acetaminophen (Ofirmev) injection 1,000 mg (0 mg Intravenous Stopped 3/13/25 1826)   sodium chloride 0.9 % bolus 1,000 mL (0 mL Intravenous Stopped 3/13/25 2022)   iohexol (OMNIPAQUE) 350 MG/ML injection (MULTI-DOSE) 100 mL (100 mL Intravenous Given 3/13/25 1847)   amoxicillin-clavulanate (AUGMENTIN) 875-125 mg per tablet 1 tablet (1 tablet Oral Given 3/13/25 2021)       ED Risk Strat Scores                            SBIRT 20yo+      Flowsheet Row Most Recent Value   Initial Alcohol Screen: US AUDIT-C      1. How often do you have a drink containing alcohol? 0 Filed at: 03/13/2025 1717   2. How many drinks containing alcohol do you have on a typical day you are drinking?  0 Filed at: 03/13/2025 1717   3a. Male UNDER 65: How often do you have five or more drinks on one occasion? 0 Filed at: 03/13/2025 1717   3b. FEMALE Any Age, or MALE 65+: How often do you have 4 or more drinks on one occassion? 0 Filed at: 03/13/2025 1717   Audit-C Score 0 Filed at: 03/13/2025 1717   NIYAH: How many times in the past year have you...    Used an illegal drug or used a prescription medication for non-medical reasons? Never Filed at: 03/13/2025 1717                            History of Present Illness       Chief Complaint   Patient presents with    Abdominal Pain     LLQ pain that started last night, no other symptoms. Sent by urgent care for scan       History reviewed. No pertinent past medical history.   Past Surgical History:   Procedure Laterality Date    HIP SURGERY Right       History reviewed. No pertinent family history.   Social History     Tobacco Use    Smoking status: Never    Smokeless tobacco: Never   Vaping Use    Vaping status: Never Used   Substance Use Topics    Alcohol use: Never      E-Cigarette/Vaping    E-Cigarette Use Never User       E-Cigarette/Vaping Substances    Nicotine No     THC No     CBD No     Flavoring No     Other No     Unknown No       I have reviewed and agree with the history as documented.     64-year-old male, no pertinent past medical history, who presents to the emergency department for left lower quadrant abdominal pain.  Started last night.  Constant.  No modifying factors.  No associated symptoms.  No history of pain like this in the past.  Denies any known history of diverticulosis.  No fevers, chills, trouble urinating.  Went to urgent care just prior to arrival and they referred him to the ED for further evaluation.  No other complaints or concerns.      Abdominal Pain      Review of  Systems   Gastrointestinal:  Positive for abdominal pain.   All other systems reviewed and are negative.          Objective       ED Triage Vitals   Temperature Pulse Blood Pressure Respirations SpO2 Patient Position - Orthostatic VS   03/13/25 1717 03/13/25 1719 03/13/25 1719 03/13/25 1717 03/13/25 1719 03/13/25 1937   98.7 °F (37.1 °C) 97 156/85 18 97 % Lying      Temp Source Heart Rate Source BP Location FiO2 (%) Pain Score    03/13/25 1937 03/13/25 1937 03/13/25 1937 -- 03/13/25 1717    Oral Monitor Right arm  10 - Worst Possible Pain      Vitals      Date and Time Temp Pulse SpO2 Resp BP Pain Score FACES Pain Rating User   03/13/25 1937 98.3 °F (36.8 °C) 85 95 % 18 107/65 -- -- [place]   03/13/25 1719 -- 97 97 % -- 156/85 -- -- [place]   03/13/25 1717 98.7 °F (37.1 °C) -- -- 18 -- 10 - Worst Possible Pain -- [place]            Physical Exam  Vitals and nursing note reviewed.   Constitutional:       General: He is not in acute distress.     Appearance: He is well-developed. He is not diaphoretic.   HENT:      Head: Normocephalic and atraumatic.      Right Ear: External ear normal.      Left Ear: External ear normal.      Nose: Nose normal.   Eyes:      General: Lids are normal. No scleral icterus.  Cardiovascular:      Rate and Rhythm: Normal rate and regular rhythm.      Heart sounds: Normal heart sounds. No murmur heard.     No friction rub. No gallop.   Pulmonary:      Effort: Pulmonary effort is normal. No respiratory distress.      Breath sounds: Normal breath sounds. No wheezing or rales.   Abdominal:      Palpations: Abdomen is soft.      Tenderness: There is abdominal tenderness in the left lower quadrant. There is no guarding or rebound.   Musculoskeletal:         General: No deformity. Normal range of motion.      Cervical back: Normal range of motion and neck supple.   Skin:     General: Skin is warm and dry.   Neurological:      General: No focal deficit present.      Mental Status: He is alert.   Psychiatric:          Mood and Affect: Mood normal.         Behavior: Behavior normal.         Results Reviewed       Procedure Component Value Units Date/Time    Urine Microscopic [[id number]]  (Abnormal) Collected: 03/13/25 2028    Lab Status: Final result Specimen: Urine, Clean Catch Updated: 03/13/25 2148     RBC, UA 10-20 /hpf      WBC, UA 0-1 /hpf      Epithelial Cells Occasional /hpf      Bacteria, UA None Seen /hpf     UA w Reflex to Microscopic w Reflex to Culture [[id number]]  (Abnormal) Collected: 03/13/25 2028    Lab Status: Final result Specimen: Urine, Clean Catch Updated: 03/13/25 2054     Color, UA Yellow     Clarity, UA Clear     Specific Gravity, UA <1.005     pH, UA 6.0     Leukocytes, UA Negative     Nitrite, UA Negative     Protein, UA Trace mg/dl      Glucose, UA Negative mg/dl      Ketones, UA Negative mg/dl      Urobilinogen, UA <2.0 mg/dl      Bilirubin, UA Negative     Occult Blood, UA Large    Comprehensive metabolic panel [[id number]] Collected: 03/13/25 1806    Lab Status: Final result Specimen: Blood from Arm, Left Updated: 03/13/25 1829     Sodium 136 mmol/L      Potassium 4.3 mmol/L      Chloride 102 mmol/L      CO2 23 mmol/L      ANION GAP 11 mmol/L      BUN 14 mg/dL      Creatinine 0.93 mg/dL      Glucose 97 mg/dL      Calcium 9.4 mg/dL      AST 19 U/L      ALT 27 U/L      Alkaline Phosphatase 41 U/L      Total Protein 7.6 g/dL      Albumin 4.3 g/dL      Total Bilirubin 0.86 mg/dL      eGFR 87 ml/min/1.73sq m     Narrative:      National Kidney Disease Foundation guidelines for Chronic Kidney Disease (CKD):     Stage 1 with normal or high GFR (GFR > 90 mL/min/1.73 square meters)    Stage 2 Mild CKD (GFR = 60-89 mL/min/1.73 square meters)    Stage 3A Moderate CKD (GFR = 45-59 mL/min/1.73 square meters)    Stage 3B Moderate CKD (GFR = 30-44 mL/min/1.73 square meters)    Stage 4 Severe CKD (GFR = 15-29 mL/min/1.73 square meters)    Stage 5 End Stage CKD (GFR <15 mL/min/1.73 square meters)  Note: GFR  calculation is accurate only with a steady state creatinine    Lipase [[id number]]  (Normal) Collected: 03/13/25 1806    Lab Status: Final result Specimen: Blood from Arm, Left Updated: 03/13/25 1829     Lipase 23 u/L     CBC and differential [[id number]]  (Abnormal) Collected: 03/13/25 1806    Lab Status: Final result Specimen: Blood from Arm, Left Updated: 03/13/25 1811     WBC 14.33 Thousand/uL      RBC 5.28 Million/uL      Hemoglobin 16.0 g/dL      Hematocrit 48.1 %      MCV 91 fL      MCH 30.3 pg      MCHC 33.3 g/dL      RDW 14.2 %      MPV 9.3 fL      Platelets 263 Thousands/uL      nRBC 0 /100 WBCs      Segmented % 79 %      Immature Grans % 1 %      Lymphocytes % 8 %      Monocytes % 11 %      Eosinophils Relative 1 %      Basophils Relative 0 %      Absolute Neutrophils 11.37 Thousands/µL      Absolute Immature Grans 0.08 Thousand/uL      Absolute Lymphocytes 1.15 Thousands/µL      Absolute Monocytes 1.57 Thousand/µL      Eosinophils Absolute 0.11 Thousand/µL      Basophils Absolute 0.05 Thousands/µL             CT abdomen pelvis w contrast   Final Interpretation by Lanre Thomas MD (03/13 2008)      Acute diverticulitis in the proximal sigmoid colon with no perforation or abscess.      Diffuse bladder wall thickening. Cystitis in the differential..         Workstation performed: [id number]             Procedures    ED Medication and Procedure Management   None     Discharge Medication List as of 3/13/2025  9:04 PM        START taking these medications    Details   amoxicillin-clavulanate (AUGMENTIN) 875-125 mg per tablet Take 1 tablet by mouth every 12 (twelve) hours for 10 days, Starting Thu 3/13/2025, Until Sun 3/23/2025, Normal           No discharge procedures on file.  ED SEPSIS DOCUMENTATION   Time reflects when diagnosis was documented in both MDM as applicable and the Disposition within this note       Time User Action Codes Description Comment    3/13/2025  8:59 PM Oziel Schulte Add  [K57.92] Diverticulitis                  Oziel Schulte,   03/13/25 2142

## 2025-04-01 ENCOUNTER — CONSULT (OUTPATIENT)
Age: 65
End: 2025-04-01
Payer: COMMERCIAL

## 2025-04-01 VITALS
HEART RATE: 52 BPM | OXYGEN SATURATION: 94 % | SYSTOLIC BLOOD PRESSURE: 121 MMHG | HEIGHT: 72 IN | WEIGHT: 245 LBS | TEMPERATURE: 96.9 F | DIASTOLIC BLOOD PRESSURE: 75 MMHG | BODY MASS INDEX: 33.18 KG/M2

## 2025-04-01 DIAGNOSIS — D17.0 LIPOMA OF NECK: Primary | ICD-10-CM

## 2025-04-01 PROCEDURE — 99204 OFFICE O/P NEW MOD 45 MIN: CPT | Performed by: SURGERY

## 2025-04-01 NOTE — H&P
Name: Chucho Montenegro      : 1960      MRN: [id number]  Encounter Provider: Mejia Purvis MD  Encounter Date: 2025   Encounter department: [facility] GENERAL SURGERY [place]  :  Assessment & Plan  Lipoma of neck  CMP and CBC 2025 largely unremarkable except mildly elevated WBC secondary to diverticulitis, now resolved.  EKG 2024 with RBBB; reviewed by cards           History of Present Illness  HPI  Chucho Montenegro is a 64 y.o. male who was referred to my office by Dr. Gómez for a subcutaneous mass on the right posterior neck.  Patient states has been there for quite some time (likely over 20 years) and slowly enlarging.  Is now causing some discomfort.          Review of Systems   Constitutional:  Negative for chills and fever.   Respiratory: Negative.     Cardiovascular: Negative.    Neurological: Negative.    Hematological: Negative.    All other systems reviewed and are negative.    Medical History Reviewed by provider this encounter:     .  Past Medical History  No past medical history on file.  Past Surgical History:   Procedure Laterality Date    HIP SURGERY Right      No family history on file.   reports that he has never smoked. He has never used smokeless tobacco. He reports that he does not drink alcohol.  No current outpatient medicationsNo Known Allergies   No current outpatient medications on file prior to visit.     No current facility-administered medications on file prior to visit.      Social History     Tobacco Use    Smoking status: Never    Smokeless tobacco: Never   Vaping Use    Vaping status: Never Used   Substance and Sexual Activity    Alcohol use: Never    Drug use: Not on file    Sexual activity: Not on file        Objective  There were no vitals taken for this visit.     Physical Exam  Vitals reviewed.   Constitutional:       General: He is not in acute distress.     Appearance: Normal appearance.   HENT:      Head: Normocephalic and atraumatic.   Neck:       Comments: Large subcutaneous lipoma right posterior neck.  Cardiovascular:      Rate and Rhythm: Normal rate.   Pulmonary:      Effort: Pulmonary effort is normal.   Musculoskeletal:         General: Normal range of motion.      Cervical back: Normal range of motion and neck supple.   Lymphadenopathy:      Cervical: No cervical adenopathy.   Skin:     General: Skin is warm and dry.   Neurological:      General: No focal deficit present.      Mental Status: He is alert.

## 2025-04-01 NOTE — PROGRESS NOTES
Name: Chucho Montenegro      : 1960      MRN: [id number]  Encounter Provider: Mejia Purvis MD  Encounter Date: 2025   Encounter department: [facility] GENERAL SURGERY [place]  :  Assessment & Plan  Lipoma of neck  CMP and CBC 2025 largely unremarkable except mildly elevated WBC secondary to diverticulitis, now resolved.  EKG 2024 with RBBB; reviewed by cards           History of Present Illness   HPI  Chucho Montenegro is a 64 y.o. male who was referred to my office by Dr. Gómez for a subcutaneous mass on the right posterior neck.  Patient states has been there for quite some time (likely over 20 years) and slowly enlarging.  Is now causing some discomfort.          Review of Systems   Constitutional:  Negative for chills and fever.   Respiratory: Negative.     Cardiovascular: Negative.    Neurological: Negative.    Hematological: Negative.    All other systems reviewed and are negative.    Medical History Reviewed by provider this encounter:     .  Past Medical History   No past medical history on file.  Past Surgical History:   Procedure Laterality Date    HIP SURGERY Right      No family history on file.   reports that he has never smoked. He has never used smokeless tobacco. He reports that he does not drink alcohol.  No current outpatient medicationsNo Known Allergies   No current outpatient medications on file prior to visit.     No current facility-administered medications on file prior to visit.      Social History     Tobacco Use    Smoking status: Never    Smokeless tobacco: Never   Vaping Use    Vaping status: Never Used   Substance and Sexual Activity    Alcohol use: Never    Drug use: Not on file    Sexual activity: Not on file        Objective   There were no vitals taken for this visit.     Physical Exam  Vitals reviewed.   Constitutional:       General: He is not in acute distress.     Appearance: Normal appearance.   HENT:      Head: Normocephalic and atraumatic.   Neck:       Comments: Large subcutaneous lipoma right posterior neck.  Cardiovascular:      Rate and Rhythm: Normal rate.   Pulmonary:      Effort: Pulmonary effort is normal.   Musculoskeletal:         General: Normal range of motion.      Cervical back: Normal range of motion and neck supple.   Lymphadenopathy:      Cervical: No cervical adenopathy.   Skin:     General: Skin is warm and dry.   Neurological:      General: No focal deficit present.      Mental Status: He is alert.

## 2025-04-17 ENCOUNTER — TELEPHONE (OUTPATIENT)
Age: 65
End: 2025-04-17

## 2025-04-17 NOTE — TELEPHONE ENCOUNTER
Spoke with patient. I called to let patient know, we longer take his insurance. Patient is not happy at all. Patient got a bit upset. I sugggested,he calls his insurance and have them clarify this issue. Patient is calling insurance and calling me back.

## 2025-04-17 NOTE — TELEPHONE ENCOUNTER
Spoke with Vaishali from [facility]/ Since Dr Purvis is out of network, Vaishali asked if patient could book surgery with an in network doctor. I mentioned Dr Mirza, and gave Dr's information to Vaishali . Vaishali confirmed that Dr Mirza is in network

## 2025-04-17 NOTE — TELEPHONE ENCOUNTER
Insurance called. [facility] is out of network for patient, and patient needs to be scheduled with surgeons in [place]. Transferred to [place].

## 2025-04-24 ENCOUNTER — OFFICE VISIT (OUTPATIENT)
Dept: SURGERY | Facility: CLINIC | Age: 65
End: 2025-04-24
Attending: FAMILY MEDICINE
Payer: COMMERCIAL

## 2025-04-24 VITALS
HEART RATE: 66 BPM | OXYGEN SATURATION: 98 % | TEMPERATURE: 98.5 F | DIASTOLIC BLOOD PRESSURE: 80 MMHG | WEIGHT: 247.2 LBS | HEIGHT: 72 IN | BODY MASS INDEX: 33.48 KG/M2 | SYSTOLIC BLOOD PRESSURE: 118 MMHG

## 2025-04-24 DIAGNOSIS — R22.1 MASS OF NECK: Primary | ICD-10-CM

## 2025-04-24 DIAGNOSIS — R31.29 MICROSCOPIC HEMATURIA: ICD-10-CM

## 2025-04-24 DIAGNOSIS — Z12.11 ENCOUNTER FOR SCREENING COLONOSCOPY: ICD-10-CM

## 2025-04-24 PROBLEM — K57.32 SIGMOID DIVERTICULITIS: Status: ACTIVE | Noted: 2025-04-24

## 2025-04-24 PROCEDURE — 99213 OFFICE O/P EST LOW 20 MIN: CPT | Performed by: SURGERY

## 2025-04-24 NOTE — PROGRESS NOTES
Name: Chucho Montenegro      : 1960      MRN: [id number]  Encounter Provider:  Klaus Mirza MD  Encounter Date: 2025   Encounter department: [facility] GENERAL SURGERY [place]  :  Assessment & Plan  Mass of neck  Posterior neck     Patient is being scheduled for excision of mass under local anesthesia in the office  Microscopic hematuria    Orders:    Urinalysis with microscopic; Future    Encounter for screening colonoscopy  Patient will be scheduled for screening colonoscopy in May           History of Present Illness   Retired male    Chucho Montenegro is a 64 y.o. male who presents with mass on the nape of the neck.    HPI  Patient states that he has a mass on the nape of the neck for many years.  Recently he was having some discomfort across his upper back with some numbness hence he sought medical attention.  He was scheduled for excision of the mass at [facility] however due to insurance reasons he was sent to the office here.    Review of Systems as per [facility].  Patient was seen in the emergency room at [facility] last month with sigmoid diverticulitis.  This was the first episode.  He was treated with outpatient antibiotic therapy and his symptoms are completely resolved.  Patient has never had a colonoscopy.    Upon lab evaluation he was found to have microscopic hematuria.  He denies any urinary symptoms.  We will investigate this and if it persist, he will need a cystoscopy.  The kidneys appear normal on the CT scan.    Pertinent Medical History   No past significant medical or surgical problems  History of right total hip replacement done many years ago           Objective     /80 (Patient Position: Sitting, Cuff Size: Large)   Pulse 66   Temp 98.5 °F (36.9 °C) (Tympanic)   Ht 6' (1.829 m)   Wt 112 kg (247 lb 3.2 oz)   SpO2 98%   BMI 33.53 kg/m²      Physical Exam   There is a subcutaneous mass on the nape of the neck which measures a 7 x 8 cm.  Clinically this feels  like a lipoma.    Radiology Results Review: I personally reviewed the following image studies in PACS and associated radiology reports: CT abdomen/pelvis and MRI spine. My interpretation of the radiology images/reports is: Lipoma on the nape of neck, sigmoid diverticulitis.

## 2025-04-28 ENCOUNTER — OFFICE VISIT (OUTPATIENT)
Dept: FAMILY MEDICINE CLINIC | Facility: CLINIC | Age: 65
End: 2025-04-28
Payer: COMMERCIAL

## 2025-04-28 VITALS
HEART RATE: 90 BPM | OXYGEN SATURATION: 98 % | TEMPERATURE: 97.9 F | BODY MASS INDEX: 33.32 KG/M2 | SYSTOLIC BLOOD PRESSURE: 110 MMHG | HEIGHT: 72 IN | DIASTOLIC BLOOD PRESSURE: 70 MMHG | WEIGHT: 246 LBS | RESPIRATION RATE: 16 BRPM

## 2025-04-28 DIAGNOSIS — R31.29 MICROSCOPIC HEMATURIA: ICD-10-CM

## 2025-04-28 DIAGNOSIS — Z01.818 PREOP TESTING: ICD-10-CM

## 2025-04-28 DIAGNOSIS — Z12.11 SCREENING FOR COLON CANCER: ICD-10-CM

## 2025-04-28 DIAGNOSIS — K57.32 SIGMOID DIVERTICULITIS: ICD-10-CM

## 2025-04-28 DIAGNOSIS — D17.0 LIPOMA OF NECK: Primary | ICD-10-CM

## 2025-04-28 LAB
SL AMB  POCT GLUCOSE, UA: NORMAL
SL AMB LEUKOCYTE ESTERASE,UA: NORMAL
SL AMB POCT BILIRUBIN,UA: NORMAL
SL AMB POCT BLOOD,UA: NORMAL
SL AMB POCT CLARITY,UA: CLEAR
SL AMB POCT COLOR,UA: YELLOW
SL AMB POCT KETONES,UA: NORMAL
SL AMB POCT NITRITE,UA: NORMAL
SL AMB POCT PH,UA: 6
SL AMB POCT SPECIFIC GRAVITY,UA: 1.01
SL AMB POCT URINE PROTEIN: NORMAL
SL AMB POCT UROBILINOGEN: NORMAL

## 2025-04-28 PROCEDURE — 81003 URINALYSIS AUTO W/O SCOPE: CPT | Performed by: FAMILY MEDICINE

## 2025-04-28 PROCEDURE — 87086 URINE CULTURE/COLONY COUNT: CPT | Performed by: FAMILY MEDICINE

## 2025-04-28 PROCEDURE — 87077 CULTURE AEROBIC IDENTIFY: CPT | Performed by: FAMILY MEDICINE

## 2025-04-28 PROCEDURE — 99214 OFFICE O/P EST MOD 30 MIN: CPT | Performed by: FAMILY MEDICINE

## 2025-04-28 NOTE — ASSESSMENT & PLAN NOTE
Repeat urine just to be sure patient does not indicate infection  Orders:  •  POCT urine dip auto non-scope  •  Urine culture

## 2025-04-28 NOTE — ASSESSMENT & PLAN NOTE
Recommend Colonoscopy in the next few months   Orders:  •  Ambulatory Referral to Gastroenterology; Future

## 2025-04-28 NOTE — PROGRESS NOTES
Pre-operative Clearance  Name: Chucho Montenegro      : 1960      MRN: [id number]  Encounter Provider: Danielle Zambrano MD  Encounter Date: 2025   Encounter department: [facility]    :  Assessment & Plan  Lipoma of neck  CLEARED FOR SURGERY       Preop testing    Orders:  •  POCT urine dip auto non-scope  •  Urine culture    Sigmoid diverticulitis  Recommend Colonoscopy in the next few months   Orders:  •  Ambulatory Referral to Gastroenterology; Future    Screening for colon cancer    Orders:  •  Ambulatory Referral to Gastroenterology; Future    Microscopic hematuria  Repeat urine just to be sure patient does not indicate infection  Orders:  •  POCT urine dip auto non-scope  •  Urine culture      Assessment & Plan        Pre-operative Clearance:     Clearance:  Patient is medically optimized (CLEARED) for proposed surgery without any additional cardiac testing.      Medication Instructions:   - Avoid herbs or non-directed vitamins one week prior to surgery    - Avoid aspirin containing medications or non-steroidal anti-inflammatory drugs one week preceding surgery    - May take tylenol for pain up until the night before surgery         History of Present Illness     Pre-Op Examination     Surgery: lipoma of the neck   Anticipated Date of Surgery: 2025   Surgeon: Dr Purvis     Overall patient states he is doing fairly well.  Denies any acute concerns.  Patient was advised to repeat a urine culture given that CAT scan showed bladder thickness..  Patient states he has not had any diverticulitis attack recently.  Is scheduled to see his gastroenterologist.     Previous history of bleeding disorders or clots?: No    Previous Anesthesia reaction?: No    Prolonged steroid use in the last 6 months?: No      Assessment of Cardiac Risk:   - Unstable or severe angina or MI in the last 6 weeks or history of stent placement in the last year?: No    - Decompensated heart failure (e.g.  New onset heart failure, NYHA  Class IV heart failure, or worsening existing heart failure)?: No    - Significant arrhythmias such as high grade AV block, symptomatic ventricular arrhythmia, newly recognized ventricular tachycardia, supraventricular tachycardia with resting heart rate >100, or symptomatic bradycardia?: No    - Severe heart valve disease including aortic stenosis or symptomatic mitral stenosis?: No      Pre-operative Risk Factors:    - History of cerebrovascular disease: No    - History of ischemic heart disease: No    - History of congestive heart failure: No    - Pre-operative treatment with insulin: No    - Pre-operative creatinine >2 mg/dL: No      Review of Systems   Constitutional:  Negative for activity change, appetite change, chills, fatigue and fever.   HENT:  Negative for congestion.    Respiratory:  Negative for cough, chest tightness and shortness of breath.    Cardiovascular:  Negative for chest pain and leg swelling.   Gastrointestinal:  Negative for abdominal distention, abdominal pain, constipation, diarrhea, nausea and vomiting.   Musculoskeletal:  Positive for arthralgias (numbness at times going down the left arm).   All other systems reviewed and are negative.    Past Medical History   History reviewed. No pertinent past medical history.  Past Surgical History:   Procedure Laterality Date   • HIP SURGERY Right      History reviewed. No pertinent family history.  Social History     Tobacco Use   • Smoking status: Former     Passive exposure: Never   • Smokeless tobacco: Never   Vaping Use   • Vaping status: Never Used   Substance and Sexual Activity   • Alcohol use: Yes     Comment: occ   • Drug use: Never   • Sexual activity: Not on file     No current outpatient medications on file prior to visit.     No Known Allergies  Objective   /70 (BP Location: Left arm, Patient Position: Sitting, Cuff Size: Adult)   Pulse 90   Temp 97.9 °F (36.6 °C) (Temporal)   Resp 16   Ht 6'  (1.829 m)   Wt 112 kg (246 lb)   SpO2 98%   BMI 33.36 kg/m²     Physical Exam  Vitals reviewed.   Constitutional:       Appearance: He is well-developed.   HENT:      Head: Normocephalic and atraumatic.      Right Ear: Tympanic membrane, ear canal and external ear normal.      Left Ear: Tympanic membrane, ear canal and external ear normal.      Nose: Nose normal.      Mouth/Throat:      Mouth: Mucous membranes are moist.   Eyes:      Conjunctiva/sclera: Conjunctivae normal.      Pupils: Pupils are equal, round, and reactive to light.   Neck:      Comments: Limited range of motion  LARGE Lipoma    Cardiovascular:      Rate and Rhythm: Normal rate and regular rhythm.      Heart sounds: Normal heart sounds.   Pulmonary:      Effort: Pulmonary effort is normal.      Breath sounds: Normal breath sounds. No wheezing.   Abdominal:      General: Bowel sounds are normal. There is no distension.      Palpations: Abdomen is soft. There is no mass.      Tenderness: There is no abdominal tenderness.   Genitourinary:     Penis: Normal.    Musculoskeletal:         General: No tenderness. Normal range of motion.      Cervical back: Normal range of motion and neck supple.   Skin:     General: Skin is warm.      Capillary Refill: Capillary refill takes less than 2 seconds.   Neurological:      Mental Status: He is alert and oriented to person, place, and time.      Motor: No abnormal muscle tone.   Psychiatric:         Behavior: Behavior normal.         Thought Content: Thought content normal.         Judgment: Judgment normal.           Danielle Zambrano MD

## 2025-04-29 LAB — BACTERIA UR CULT: ABNORMAL

## 2025-04-30 ENCOUNTER — RESULTS FOLLOW-UP (OUTPATIENT)
Dept: FAMILY MEDICINE CLINIC | Facility: CLINIC | Age: 65
End: 2025-04-30

## 2025-04-30 DIAGNOSIS — R31.29 MICROSCOPIC HEMATURIA: Primary | ICD-10-CM

## 2025-05-01 ENCOUNTER — PROCEDURE VISIT (OUTPATIENT)
Dept: SURGERY | Facility: CLINIC | Age: 65
End: 2025-05-01

## 2025-05-01 VITALS
BODY MASS INDEX: 33.32 KG/M2 | WEIGHT: 246 LBS | TEMPERATURE: 97.9 F | HEIGHT: 72 IN | SYSTOLIC BLOOD PRESSURE: 130 MMHG | OXYGEN SATURATION: 98 % | DIASTOLIC BLOOD PRESSURE: 86 MMHG | HEART RATE: 90 BPM

## 2025-05-01 DIAGNOSIS — D17.0 LIPOMA OF NECK: ICD-10-CM

## 2025-05-01 DIAGNOSIS — R22.1 MASS OF NECK: Primary | ICD-10-CM

## 2025-05-01 PROCEDURE — 88304 TISSUE EXAM BY PATHOLOGIST: CPT | Performed by: STUDENT IN AN ORGANIZED HEALTH CARE EDUCATION/TRAINING PROGRAM

## 2025-05-01 RX ORDER — OXYCODONE HYDROCHLORIDE 5 MG/1
5 TABLET ORAL EVERY 4 HOURS PRN
Qty: 12 TABLET | Refills: 0 | Status: SHIPPED | OUTPATIENT
Start: 2025-05-01

## 2025-05-01 NOTE — PATIENT INSTRUCTIONS
Patient advised to rest today and lay on his back  Change dressings if necessary.  Use gauze and tape  May shower after 48 hours and change dressings

## 2025-05-01 NOTE — PROGRESS NOTES
Procedures    Diagnosis: Mass on nape of neck, size 7 cms    Procedure: Excision of mass on the nape of neck    Written consent obtained from patient    Skin prepped with Betadine    Local anesthesia used is 36 mL of 1% lidocaine with 1 in 200,000 epinephrine with sodium bicarbonate    Mass excised and sent to pathological examination.  Grossly appears like a lipoma  Hemostasis done with interrupted 2-0 and 3-0 Vicryl sutures    Skin incision closed with interrupted vertical mattress 3-0 nylon sutures    Gauze and ABD dressing applied    Patient tolerated procedure well

## 2025-05-12 ENCOUNTER — OFFICE VISIT (OUTPATIENT)
Dept: SURGERY | Facility: CLINIC | Age: 65
End: 2025-05-12

## 2025-05-12 VITALS
HEART RATE: 70 BPM | TEMPERATURE: 97.8 F | OXYGEN SATURATION: 97 % | WEIGHT: 246 LBS | BODY MASS INDEX: 33.32 KG/M2 | DIASTOLIC BLOOD PRESSURE: 74 MMHG | SYSTOLIC BLOOD PRESSURE: 130 MMHG | HEIGHT: 72 IN

## 2025-05-12 DIAGNOSIS — R22.1 MASS OF NECK: Primary | ICD-10-CM

## 2025-05-12 PROCEDURE — 99024 POSTOP FOLLOW-UP VISIT: CPT | Performed by: SURGERY

## 2025-05-12 NOTE — PROGRESS NOTES
Assessment/Plan:     Diagnoses and all orders for this visit:    Mass of neck     Lipoma on lower posterior neck.  Pathology report checked.    Discharge and see as needed.    Subjective:      Patient ID: Chucho Montenegro is a 64 y.o. male.    HPI  Patient is 11 days status post excision of a large mass from the posterior neck.  Patient has no complaints.    The following portions of the patient's history were reviewed and updated as appropriate: allergies, current medications, past family history, past medical history, past social history, past surgical history, and problem list.    Review of Systems    No fever    Objective:    /74 (BP Location: Left arm, Patient Position: Sitting, Cuff Size: Standard)   Pulse 70   Temp 97.8 °F (36.6 °C) (Tympanic)   Ht 6' (1.829 m)   Wt 112 kg (246 lb)   SpO2 97%   BMI 33.36 kg/m²      Physical Exam    The incision is healed satisfactorily.  All sutures removed.

## 2025-07-31 ENCOUNTER — TELEPHONE (OUTPATIENT)
Age: 65
End: 2025-07-31

## 2025-08-04 ENCOUNTER — OFFICE VISIT (OUTPATIENT)
Age: 65
End: 2025-08-04
Payer: COMMERCIAL

## 2025-08-04 VITALS
WEIGHT: 233.2 LBS | BODY MASS INDEX: 31.59 KG/M2 | HEART RATE: 77 BPM | DIASTOLIC BLOOD PRESSURE: 92 MMHG | SYSTOLIC BLOOD PRESSURE: 128 MMHG | HEIGHT: 72 IN

## 2025-08-04 DIAGNOSIS — Z12.11 SCREENING FOR COLON CANCER: ICD-10-CM

## 2025-08-04 DIAGNOSIS — K57.92 DIVERTICULITIS: Primary | ICD-10-CM

## 2025-08-04 PROCEDURE — 99204 OFFICE O/P NEW MOD 45 MIN: CPT | Performed by: INTERNAL MEDICINE

## 2025-08-04 RX ORDER — SODIUM CHLORIDE, SODIUM LACTATE, POTASSIUM CHLORIDE, CALCIUM CHLORIDE 600; 310; 30; 20 MG/100ML; MG/100ML; MG/100ML; MG/100ML
125 INJECTION, SOLUTION INTRAVENOUS CONTINUOUS
OUTPATIENT
Start: 2025-08-04

## 2025-08-04 RX ORDER — POLYETHYLENE GLYCOL 3350, SODIUM CHLORIDE, SODIUM BICARBONATE, POTASSIUM CHLORIDE 420; 11.2; 5.72; 1.48 G/4L; G/4L; G/4L; G/4L
4000 POWDER, FOR SOLUTION ORAL ONCE
Qty: 4000 ML | Refills: 0 | Status: SHIPPED | OUTPATIENT
Start: 2025-08-04 | End: 2025-08-04